# Patient Record
Sex: FEMALE | Race: WHITE | Employment: UNEMPLOYED | URBAN - METROPOLITAN AREA
[De-identification: names, ages, dates, MRNs, and addresses within clinical notes are randomized per-mention and may not be internally consistent; named-entity substitution may affect disease eponyms.]

---

## 2017-03-17 ENCOUNTER — HOSPITAL ENCOUNTER (OUTPATIENT)
Dept: OTHER | Age: 37
Discharge: HOME | End: 2017-03-17
Attending: INTERNAL MEDICINE | Admitting: INTERNAL MEDICINE

## 2017-03-17 LAB
ALBUMIN SERPL-MCNC: 4.6 GM/DL (ref 3.4–5)
ALP BLD-CCNC: 113 IU/L (ref 40–129)
ALT SERPL-CCNC: 59 U/L (ref 10–40)
AST SERPL-CCNC: 38 IU/L (ref 15–37)
BILIRUB SERPL-MCNC: 0.2 MG/DL (ref 0–1)
BILIRUBIN DIRECT: 0.2 MG/DL (ref 0–0.3)
BILIRUBIN, INDIRECT: 0 MG/DL (ref 0–0.7)
TOTAL PROTEIN: 7.9 GM/DL (ref 6.4–8.2)

## 2017-09-27 PROBLEM — R50.9 FEVER: Status: ACTIVE | Noted: 2017-09-27

## 2017-09-27 PROBLEM — R00.0 TACHYCARDIA: Status: ACTIVE | Noted: 2017-09-27

## 2017-09-27 PROBLEM — F19.10 POLYSUBSTANCE ABUSE (HCC): Chronic | Status: ACTIVE | Noted: 2017-09-27

## 2017-09-27 PROBLEM — Z72.0 TOBACCO ABUSE: Status: ACTIVE | Noted: 2017-09-27

## 2017-09-27 PROBLEM — M79.10 GENERALIZED MUSCLE ACHE: Status: ACTIVE | Noted: 2017-09-27

## 2017-09-27 PROBLEM — M54.50 LOW BACK PAIN: Status: ACTIVE | Noted: 2017-09-27

## 2018-04-04 ENCOUNTER — HOSPITAL ENCOUNTER (OUTPATIENT)
Dept: OTHER | Age: 38
Discharge: HOME | End: 2018-04-04
Attending: EMERGENCY MEDICINE | Admitting: EMERGENCY MEDICINE

## 2018-04-08 LAB
CULTURE: NORMAL
ORGANISM: NORMAL
REPORT STATUS: NORMAL
REQUEST PROBLEM: NORMAL
SPECIMEN: NORMAL

## 2018-05-08 ENCOUNTER — HOSPITAL ENCOUNTER (OUTPATIENT)
Dept: WOUND CARE | Age: 38
Discharge: HOME | End: 2018-05-08
Attending: FAMILY MEDICINE | Admitting: FAMILY MEDICINE

## 2018-05-08 VITALS
BODY MASS INDEX: 26.43 KG/M2 | HEART RATE: 73 BPM | SYSTOLIC BLOOD PRESSURE: 117 MMHG | RESPIRATION RATE: 16 BRPM | TEMPERATURE: 96.8 F | DIASTOLIC BLOOD PRESSURE: 83 MMHG | HEIGHT: 61 IN | WEIGHT: 140 LBS

## 2018-05-08 DIAGNOSIS — S31.109A: ICD-10-CM

## 2018-05-08 RX ORDER — HYDROXYZINE PAMOATE 50 MG/1
50 CAPSULE ORAL 2 TIMES DAILY
COMMUNITY

## 2018-05-08 RX ORDER — NAPROXEN 500 MG/1
500 TABLET ORAL 2 TIMES DAILY WITH MEALS
COMMUNITY
End: 2018-12-07

## 2018-05-13 LAB
CULTURE: NORMAL
CULTURE: NORMAL
REPORT STATUS: NORMAL
REQUEST PROBLEM: NORMAL
SPECIMEN: NORMAL

## 2018-05-16 ENCOUNTER — HOSPITAL ENCOUNTER (OUTPATIENT)
Dept: WOUND CARE | Age: 38
Discharge: HOME | End: 2018-05-16
Attending: SURGERY | Admitting: SURGERY

## 2018-05-16 VITALS
RESPIRATION RATE: 16 BRPM | DIASTOLIC BLOOD PRESSURE: 73 MMHG | HEART RATE: 90 BPM | TEMPERATURE: 97.2 F | SYSTOLIC BLOOD PRESSURE: 112 MMHG

## 2018-05-16 DIAGNOSIS — S31.109A: Primary | ICD-10-CM

## 2018-05-16 PROCEDURE — 99213 OFFICE O/P EST LOW 20 MIN: CPT | Performed by: SURGERY

## 2018-12-07 ENCOUNTER — APPOINTMENT (OUTPATIENT)
Dept: ULTRASOUND IMAGING | Age: 38
End: 2018-12-07
Payer: MEDICAID

## 2018-12-07 ENCOUNTER — APPOINTMENT (OUTPATIENT)
Dept: CT IMAGING | Age: 38
End: 2018-12-07
Payer: MEDICAID

## 2018-12-07 ENCOUNTER — HOSPITAL ENCOUNTER (EMERGENCY)
Age: 38
Discharge: HOME | End: 2018-12-07
Attending: EMERGENCY MEDICINE
Payer: MEDICAID

## 2018-12-07 VITALS
OXYGEN SATURATION: 97 % | SYSTOLIC BLOOD PRESSURE: 112 MMHG | WEIGHT: 140 LBS | TEMPERATURE: 98.2 F | RESPIRATION RATE: 20 BRPM | BODY MASS INDEX: 26.45 KG/M2 | DIASTOLIC BLOOD PRESSURE: 66 MMHG | HEART RATE: 93 BPM

## 2018-12-07 DIAGNOSIS — N83.201 RIGHT OVARIAN CYST: Primary | ICD-10-CM

## 2018-12-07 LAB
ALBUMIN SERPL-MCNC: 4.3 GM/DL (ref 3.4–5)
ALP BLD-CCNC: 108 IU/L (ref 40–128)
ALT SERPL-CCNC: 31 U/L (ref 10–40)
ANION GAP SERPL CALCULATED.3IONS-SCNC: 12 MMOL/L (ref 4–16)
AST SERPL-CCNC: 32 IU/L (ref 15–37)
BACTERIA: ABNORMAL /HPF
BASOPHILS ABSOLUTE: 0 K/CU MM
BASOPHILS RELATIVE PERCENT: 0.4 % (ref 0–1)
BILIRUB SERPL-MCNC: 0.3 MG/DL (ref 0–1)
BILIRUBIN URINE: NEGATIVE MG/DL
BLOOD, URINE: NEGATIVE
BUN BLDV-MCNC: 9 MG/DL (ref 6–23)
CALCIUM SERPL-MCNC: 9.6 MG/DL (ref 8.3–10.6)
CHLORIDE BLD-SCNC: 101 MMOL/L (ref 99–110)
CLARITY: CLEAR
CO2: 26 MMOL/L (ref 21–32)
COLOR: YELLOW
CREAT SERPL-MCNC: 0.7 MG/DL (ref 0.6–1.1)
DIFFERENTIAL TYPE: ABNORMAL
EOSINOPHILS ABSOLUTE: 0.1 K/CU MM
EOSINOPHILS RELATIVE PERCENT: 1.6 % (ref 0–3)
GFR AFRICAN AMERICAN: >60 ML/MIN/1.73M2
GFR NON-AFRICAN AMERICAN: >60 ML/MIN/1.73M2
GLUCOSE BLD-MCNC: 110 MG/DL (ref 70–99)
GLUCOSE, URINE: NEGATIVE MG/DL
HCG QUALITATIVE: NEGATIVE
HCT VFR BLD CALC: 41.4 % (ref 37–47)
HEMOGLOBIN: 12.9 GM/DL (ref 12.5–16)
IMMATURE NEUTROPHIL %: 0.1 % (ref 0–0.43)
KETONES, URINE: NEGATIVE MG/DL
LACTATE: 1.2 MMOL/L (ref 0.4–2)
LEUKOCYTE ESTERASE, URINE: NEGATIVE
LYMPHOCYTES ABSOLUTE: 2.7 K/CU MM
LYMPHOCYTES RELATIVE PERCENT: 33 % (ref 24–44)
MCH RBC QN AUTO: 26 PG (ref 27–31)
MCHC RBC AUTO-ENTMCNC: 31.2 % (ref 32–36)
MCV RBC AUTO: 83.3 FL (ref 78–100)
MONOCYTES ABSOLUTE: 0.5 K/CU MM
MONOCYTES RELATIVE PERCENT: 5.5 % (ref 0–4)
MUCUS: ABNORMAL HPF
NITRITE URINE, QUANTITATIVE: NEGATIVE
NUCLEATED RBC %: 0 %
PDW BLD-RTO: 14 % (ref 11.7–14.9)
PH, URINE: 6 (ref 5–8)
PLATELET # BLD: 333 K/CU MM (ref 140–440)
PMV BLD AUTO: 8.7 FL (ref 7.5–11.1)
POTASSIUM SERPL-SCNC: 4.2 MMOL/L (ref 3.5–5.1)
PROTEIN UA: NEGATIVE MG/DL
RBC # BLD: 4.97 M/CU MM (ref 4.2–5.4)
RBC URINE: 13 /HPF (ref 0–6)
SEGMENTED NEUTROPHILS ABSOLUTE COUNT: 4.9 K/CU MM
SEGMENTED NEUTROPHILS RELATIVE PERCENT: 59.4 % (ref 36–66)
SODIUM BLD-SCNC: 139 MMOL/L (ref 135–145)
SPECIFIC GRAVITY UA: 1.02 (ref 1–1.03)
SQUAMOUS EPITHELIAL: 1 /HPF
TOTAL IMMATURE NEUTOROPHIL: 0.01 K/CU MM
TOTAL NUCLEATED RBC: 0 K/CU MM
TOTAL PROTEIN: 8.1 GM/DL (ref 6.4–8.2)
TRICHOMONAS: ABNORMAL /HPF
UROBILINOGEN, URINE: 1 MG/DL (ref 0.2–1)
WBC # BLD: 8.2 K/CU MM (ref 4–10.5)
WBC UA: 1 /HPF (ref 0–5)

## 2018-12-07 PROCEDURE — 74177 CT ABD & PELVIS W/CONTRAST: CPT

## 2018-12-07 PROCEDURE — 80053 COMPREHEN METABOLIC PANEL: CPT

## 2018-12-07 PROCEDURE — 6360000004 HC RX CONTRAST MEDICATION: Performed by: EMERGENCY MEDICINE

## 2018-12-07 PROCEDURE — 76830 TRANSVAGINAL US NON-OB: CPT

## 2018-12-07 PROCEDURE — 83605 ASSAY OF LACTIC ACID: CPT

## 2018-12-07 PROCEDURE — 84703 CHORIONIC GONADOTROPIN ASSAY: CPT

## 2018-12-07 PROCEDURE — 93975 VASCULAR STUDY: CPT

## 2018-12-07 PROCEDURE — 96374 THER/PROPH/DIAG INJ IV PUSH: CPT

## 2018-12-07 PROCEDURE — 36415 COLL VENOUS BLD VENIPUNCTURE: CPT

## 2018-12-07 PROCEDURE — 96375 TX/PRO/DX INJ NEW DRUG ADDON: CPT

## 2018-12-07 PROCEDURE — 6360000002 HC RX W HCPCS: Performed by: EMERGENCY MEDICINE

## 2018-12-07 PROCEDURE — 2580000003 HC RX 258: Performed by: EMERGENCY MEDICINE

## 2018-12-07 PROCEDURE — 81001 URINALYSIS AUTO W/SCOPE: CPT

## 2018-12-07 PROCEDURE — 85025 COMPLETE CBC W/AUTO DIFF WBC: CPT

## 2018-12-07 PROCEDURE — 99284 EMERGENCY DEPT VISIT MOD MDM: CPT

## 2018-12-07 RX ORDER — KETOROLAC TROMETHAMINE 30 MG/ML
30 INJECTION, SOLUTION INTRAMUSCULAR; INTRAVENOUS ONCE
Status: COMPLETED | OUTPATIENT
Start: 2018-12-07 | End: 2018-12-07

## 2018-12-07 RX ORDER — FENTANYL CITRATE 50 UG/ML
50 INJECTION, SOLUTION INTRAMUSCULAR; INTRAVENOUS ONCE
Status: COMPLETED | OUTPATIENT
Start: 2018-12-07 | End: 2018-12-07

## 2018-12-07 RX ORDER — SODIUM CHLORIDE 0.9 % (FLUSH) 0.9 %
10 SYRINGE (ML) INJECTION 2 TIMES DAILY
Status: DISCONTINUED | OUTPATIENT
Start: 2018-12-07 | End: 2018-12-07 | Stop reason: HOSPADM

## 2018-12-07 RX ORDER — NAPROXEN 500 MG/1
500 TABLET ORAL 2 TIMES DAILY WITH MEALS
Qty: 30 TABLET | Refills: 0 | Status: SHIPPED | OUTPATIENT
Start: 2018-12-07 | End: 2020-04-20 | Stop reason: ALTCHOICE

## 2018-12-07 RX ORDER — 0.9 % SODIUM CHLORIDE 0.9 %
1000 INTRAVENOUS SOLUTION INTRAVENOUS ONCE
Status: COMPLETED | OUTPATIENT
Start: 2018-12-07 | End: 2018-12-07

## 2018-12-07 RX ADMIN — FENTANYL CITRATE 50 MCG: 50 INJECTION, SOLUTION INTRAMUSCULAR; INTRAVENOUS at 03:01

## 2018-12-07 RX ADMIN — SODIUM CHLORIDE, PRESERVATIVE FREE 10 ML: 5 INJECTION INTRAVENOUS at 03:18

## 2018-12-07 RX ADMIN — SODIUM CHLORIDE 1000 ML: 9 INJECTION, SOLUTION INTRAVENOUS at 04:49

## 2018-12-07 RX ADMIN — KETOROLAC TROMETHAMINE 30 MG: 30 INJECTION, SOLUTION INTRAMUSCULAR at 06:04

## 2018-12-07 RX ADMIN — IOPAMIDOL 80 ML: 755 INJECTION, SOLUTION INTRAVENOUS at 03:18

## 2018-12-07 ASSESSMENT — PAIN DESCRIPTION - LOCATION: LOCATION: ABDOMEN

## 2018-12-07 ASSESSMENT — PAIN SCALES - GENERAL
PAINLEVEL_OUTOF10: 9
PAINLEVEL_OUTOF10: 9
PAINLEVEL_OUTOF10: 6

## 2018-12-07 NOTE — ED PROVIDER NOTES
abdomen and pelvis was performed with the administration of intravenous contrast. Multiplanar reformatted images are provided for review. Dose modulation, iterative reconstruction, and/or weight based adjustment of the mA/kV was utilized to reduce the radiation dose to as low as reasonably achievable. COMPARISON: 08/16/2017 and 09/27/2017 HISTORY: ORDERING SYSTEM PROVIDED HISTORY: ABDOMINAL PAIN TECHNOLOGIST PROVIDED HISTORY: IV contrast only. Thank you. FINDINGS: Lower Chest: The lung bases are clear and the heart size is normal.  There is minimal dependent atelectasis. Organs: The liver, spleen, pancreas, adrenal glands and kidneys are normal. There are no calcified gallstones. There is a Phrygian cap. GI/Bowel: There is a moderate to large gas and stool load throughout the colon. No evidence of bowel obstruction. The appendix is normal.  Small bowel loops are normal in caliber. Pelvis: There is soft tissue fullness in both adnexal regions. Margins of the ovaries are not well defined due to unopacified bowel and fat stranding. There is an oval-shaped cystic lesion with thin enhancing rim measuring 2.8 cm x 1.7 cm there is trace free fluid. The uterus is retroverted in position. Urinary bladder is grossly normal. Peritoneum/Retroperitoneum: There is no adenopathy, free air or free fluid. Bones/Soft Tissues: There is a midline ventral hernia in the mid pelvis containing fat and a knuckle of bowel. There is no evidence of incarceration. The size of the hernia is unchanged from the prior study with ventral defect measuring approximately 2.1 cm. There is partial lumbarization of S1. S1 is sclerotic and with a central cleft, unchanged. No acute bone finding. Soft tissue fullness in both adnexal regions with fat stranding and trace fluid. Oval-shaped cystic structure in the right adnexa measures 2.8 cm x 1.7 cm.   While findings may merely represent follicular prominence of the ovaries with physiologic fluid and a right ovarian cyst, PID is not excluded. Clinical correlation and follow-up pelvic ultrasound may be helpful. Stable appearance of the lumbar wide S1 vertebral body. Remote osteomyelitis has been suggested in the past.  No definite acute bone finding. Moderate to large gas and stool load throughout the colon suggests constipation. Us Dup Abd Pel Retro Scrot Complete    1. 2.3 cm complex right ovarian cystic lesion. Given the patient's history, this most likely reflects an atypical endometrioma. Follow-up pelvic ultrasound is recommended in 6-12 weeks. Please see additional follow-up recommendations below. 2. Normal Doppler flow within the ovaries. 3. No findings to suggest pelvic inflammatory disease. RECOMMENDATIONS: Recommend pelvic US follow-up in 6-12 weeks; if unchanged, continue follow-up with US OR MRI with IV contrast - if follow-up studies do not confirm endometrioma or dermoid, consider surgical evaluation. Reference: Radiology 2010 Sep;256(3):943-54       ED Course and MDM:  Patient is given pain medication as well as IV fluids. On repeat assessment, she is sleeping and much more comfortable. On repeat abdominal exam, she has no areas of reproducible tenderness. Labs are reviewed. No significant leukocytosis. Lactic acid normal.  Pregnancy negative. Given patient's history and presenting discomfort, did obtain imaging. There is some question of changes around the ovaries on CT. Pelvic ultrasound obtained that shows a right ovarian cyst with normal Doppler flow to both ovaries. No findings to suggest PID. As patient's vitals are now normalized and she has no reproducible tenderness, I think she is appropriate for outpatient management. She is given prescription for anti-inflammatories to use at home and referred for follow-up with ObGyn in the next 2-3 days. She is given return precautions.   Patient verbalizes understanding and is comfortable with the plan of care at

## 2019-07-28 ENCOUNTER — HOSPITAL ENCOUNTER (EMERGENCY)
Age: 39
Discharge: HOME | End: 2019-07-28
Attending: EMERGENCY MEDICINE
Payer: MEDICAID

## 2019-07-28 VITALS
OXYGEN SATURATION: 100 % | HEIGHT: 61 IN | RESPIRATION RATE: 18 BRPM | WEIGHT: 140 LBS | SYSTOLIC BLOOD PRESSURE: 130 MMHG | HEART RATE: 85 BPM | DIASTOLIC BLOOD PRESSURE: 89 MMHG | BODY MASS INDEX: 26.43 KG/M2 | TEMPERATURE: 98.4 F

## 2019-07-28 DIAGNOSIS — T40.604A OPIATE OVERDOSE, UNDETERMINED INTENT, INITIAL ENCOUNTER (HCC): Primary | ICD-10-CM

## 2019-07-28 LAB — GLUCOSE BLD-MCNC: 73 MG/DL (ref 70–99)

## 2019-07-28 PROCEDURE — 82962 GLUCOSE BLOOD TEST: CPT

## 2019-07-28 PROCEDURE — 99284 EMERGENCY DEPT VISIT MOD MDM: CPT

## 2019-07-29 NOTE — ED PROVIDER NOTES
Emergency Department Encounter    Patient: Roxana Goncalves  MRN: [id number]  : 1980  Date of Evaluation: 2019  ED Provider:  Roslyn Castro    Triage Chief Complaint:   Drug Overdose (pt admits to injecting heroin)    [place]:  Roxana Goncalves is a 46 y.o. female that presents in police custody for injecting herself with heroin today. She was found by bystander in her car being unresponsive. She has unlabored respiration upon arrival by EMS and did not give her any Narcan as she had saturation of 99% on room air. She has a history of heroin abuse in the past.  She admitted to EMS that she has been using heroin as well. There were multiple syringes near her in a car. ROS:  Review of Systems   Unable to perform ROS: Mental status change   Constitutional: Positive for activity change.          Past Medical History:   Diagnosis Date    Anxiety     Asthma     Bipolar 1 disorder ([facility] [address]     Depression     Heroin dependence ([facility] [address]     Osteomyelitis (HCC)      Past Surgical History:   Procedure Laterality Date    ABDOMINAL EXPLORATION SURGERY  11/7/15    sacral abcess debridment and drainage    TUBAL LIGATION      URETER STENT PLACEMENT       Family History   Problem Relation Age of Onset    Heart Disease Father      Social History     Socioeconomic History    Marital status: Single     Spouse name: Not on file    Number of children: 3    Years of education: Not on file    Highest education level: Not on file   Occupational History    Not on file   Social Needs    Financial resource strain: Not on file    Food insecurity:     Worry: Not on file     Inability: Not on file    Transportation needs:     Medical: Not on file     Non-medical: Not on file   Tobacco Use    Smoking status: Current Every Day Smoker     Packs/day: 2.00     Years: 23.00     Pack years: 46.00     Types: Cigarettes    Smokeless tobacco: Never Used   Substance and Sexual Activity    Alcohol use: No     Alcohol/week: 0.0 standard

## 2019-12-20 ENCOUNTER — HOSPITAL ENCOUNTER (EMERGENCY)
Age: 39
Discharge: HOME | End: 2019-12-21
Attending: EMERGENCY MEDICINE

## 2019-12-20 VITALS
TEMPERATURE: 98.5 F | WEIGHT: 135 LBS | RESPIRATION RATE: 14 BRPM | BODY MASS INDEX: 26.5 KG/M2 | HEIGHT: 60 IN | SYSTOLIC BLOOD PRESSURE: 134 MMHG | DIASTOLIC BLOOD PRESSURE: 76 MMHG | HEART RATE: 116 BPM | OXYGEN SATURATION: 99 %

## 2019-12-20 DIAGNOSIS — N39.0 ACUTE UTI: ICD-10-CM

## 2019-12-20 DIAGNOSIS — K42.9 UMBILICAL HERNIA WITHOUT OBSTRUCTION AND WITHOUT GANGRENE: Primary | ICD-10-CM

## 2019-12-20 LAB
ALBUMIN SERPL-MCNC: 3.7 GM/DL (ref 3.4–5)
ALP BLD-CCNC: 93 IU/L (ref 40–129)
ALT SERPL-CCNC: 25 U/L (ref 10–40)
AMPHETAMINES: ABNORMAL
ANION GAP SERPL CALCULATED.3IONS-SCNC: 12 MMOL/L (ref 4–16)
AST SERPL-CCNC: 29 IU/L (ref 15–37)
BARBITURATE SCREEN URINE: NEGATIVE
BASOPHILS ABSOLUTE: 0 K/CU MM
BASOPHILS RELATIVE PERCENT: 0.2 % (ref 0–1)
BENZODIAZEPINE SCREEN, URINE: NEGATIVE
BILIRUB SERPL-MCNC: 0.3 MG/DL (ref 0–1)
BUN BLDV-MCNC: 10 MG/DL (ref 6–23)
CALCIUM SERPL-MCNC: 9.1 MG/DL (ref 8.3–10.6)
CANNABINOID SCREEN URINE: NEGATIVE
CHLORIDE BLD-SCNC: 99 MMOL/L (ref 99–110)
CO2: 25 MMOL/L (ref 21–32)
COCAINE METABOLITE: NEGATIVE
CREAT SERPL-MCNC: 0.8 MG/DL (ref 0.6–1.1)
DIFFERENTIAL TYPE: ABNORMAL
EOSINOPHILS ABSOLUTE: 0.1 K/CU MM
EOSINOPHILS RELATIVE PERCENT: 0.9 % (ref 0–3)
GFR AFRICAN AMERICAN: >60 ML/MIN/1.73M2
GFR NON-AFRICAN AMERICAN: >60 ML/MIN/1.73M2
GLUCOSE BLD-MCNC: 102 MG/DL (ref 70–99)
GONADOTROPIN, CHORIONIC (HCG) QUANT: NORMAL UIU/ML
HCT VFR BLD CALC: 39.3 % (ref 37–47)
HEMOGLOBIN: 12.3 GM/DL (ref 12.5–16)
IMMATURE NEUTROPHIL %: 0.2 % (ref 0–0.43)
LYMPHOCYTES ABSOLUTE: 2 K/CU MM
LYMPHOCYTES RELATIVE PERCENT: 22.1 % (ref 24–44)
MCH RBC QN AUTO: 25.6 PG (ref 27–31)
MCHC RBC AUTO-ENTMCNC: 31.3 % (ref 32–36)
MCV RBC AUTO: 81.7 FL (ref 78–100)
MONOCYTES ABSOLUTE: 0.5 K/CU MM
MONOCYTES RELATIVE PERCENT: 5.6 % (ref 0–4)
NUCLEATED RBC %: 0 %
OPIATES, URINE: NEGATIVE
OXYCODONE: ABNORMAL
PDW BLD-RTO: 13.7 % (ref 11.7–14.9)
PHENCYCLIDINE, URINE: NEGATIVE
PLATELET # BLD: 379 K/CU MM (ref 140–440)
PMV BLD AUTO: 8.9 FL (ref 7.5–11.1)
POTASSIUM SERPL-SCNC: 3.7 MMOL/L (ref 3.5–5.1)
RBC # BLD: 4.81 M/CU MM (ref 4.2–5.4)
SEGMENTED NEUTROPHILS ABSOLUTE COUNT: 6.6 K/CU MM
SEGMENTED NEUTROPHILS RELATIVE PERCENT: 71 % (ref 36–66)
SODIUM BLD-SCNC: 136 MMOL/L (ref 135–145)
TOTAL IMMATURE NEUTOROPHIL: 0.02 K/CU MM
TOTAL NUCLEATED RBC: 0 K/CU MM
TOTAL PROTEIN: 8.1 GM/DL (ref 6.4–8.2)
WBC # BLD: 9.2 K/CU MM (ref 4–10.5)

## 2019-12-20 PROCEDURE — 81001 URINALYSIS AUTO W/SCOPE: CPT

## 2019-12-20 PROCEDURE — 84702 CHORIONIC GONADOTROPIN TEST: CPT

## 2019-12-20 PROCEDURE — 80307 DRUG TEST PRSMV CHEM ANLYZR: CPT

## 2019-12-20 PROCEDURE — 80053 COMPREHEN METABOLIC PANEL: CPT

## 2019-12-20 PROCEDURE — 99284 EMERGENCY DEPT VISIT MOD MDM: CPT

## 2019-12-20 PROCEDURE — 85025 COMPLETE CBC W/AUTO DIFF WBC: CPT

## 2019-12-20 ASSESSMENT — ENCOUNTER SYMPTOMS
EYE DISCHARGE: 0
ABDOMINAL PAIN: 1
NAUSEA: 0
SHORTNESS OF BREATH: 0
COUGH: 0
EYE PAIN: 0
SORE THROAT: 0
BACK PAIN: 0
RHINORRHEA: 0
VOMITING: 0

## 2019-12-20 ASSESSMENT — PAIN SCALES - GENERAL: PAINLEVEL_OUTOF10: 8

## 2019-12-21 ENCOUNTER — APPOINTMENT (OUTPATIENT)
Dept: CT IMAGING | Age: 39
End: 2019-12-21

## 2019-12-21 LAB
BACTERIA: NEGATIVE /HPF
BILIRUBIN URINE: NEGATIVE MG/DL
BLOOD, URINE: ABNORMAL
CLARITY: ABNORMAL
COLOR: YELLOW
GLUCOSE, URINE: NEGATIVE MG/DL
KETONES, URINE: NEGATIVE MG/DL
LEUKOCYTE ESTERASE, URINE: ABNORMAL
MUCUS: ABNORMAL HPF
NITRITE URINE, QUANTITATIVE: POSITIVE
PH, URINE: 6 (ref 5–8)
PROTEIN UA: 100 MG/DL
RBC URINE: 6 /HPF (ref 0–6)
SPECIFIC GRAVITY UA: 1.02 (ref 1–1.03)
SQUAMOUS EPITHELIAL: 3 /HPF
TRICHOMONAS: ABNORMAL /HPF
UROBILINOGEN, URINE: NORMAL MG/DL (ref 0.2–1)
WBC UA: 620 /HPF (ref 0–5)

## 2019-12-21 PROCEDURE — 74176 CT ABD & PELVIS W/O CONTRAST: CPT

## 2019-12-21 RX ORDER — CEPHALEXIN 500 MG/1
500 CAPSULE ORAL 2 TIMES DAILY
Qty: 14 CAPSULE | Refills: 0 | Status: SHIPPED | OUTPATIENT
Start: 2019-12-21 | End: 2019-12-28

## 2019-12-21 RX ORDER — CEPHALEXIN 250 MG/1
500 CAPSULE ORAL ONCE
Status: DISCONTINUED | OUTPATIENT
Start: 2019-12-21 | End: 2019-12-21 | Stop reason: HOSPADM

## 2020-04-20 ENCOUNTER — HOSPITAL ENCOUNTER (OUTPATIENT)
Age: 40
Setting detail: SPECIMEN
Discharge: HOME | End: 2020-04-20
Payer: COMMERCIAL

## 2020-04-20 PROCEDURE — 87073 CULTURE BACTERIA ANAEROBIC: CPT

## 2020-04-20 PROCEDURE — 87077 CULTURE AEROBIC IDENTIFY: CPT

## 2020-04-20 PROCEDURE — 87186 SC STD MICRODIL/AGAR DIL: CPT

## 2020-04-20 PROCEDURE — 87147 CULTURE TYPE IMMUNOLOGIC: CPT

## 2020-04-20 PROCEDURE — 87071 CULTURE AEROBIC QUANT OTHER: CPT

## 2020-04-24 LAB
CULTURE: ABNORMAL
Lab: ABNORMAL
SPECIMEN: ABNORMAL

## 2020-05-27 ENCOUNTER — HOSPITAL ENCOUNTER (OUTPATIENT)
Dept: WOUND CARE | Age: 40
Discharge: HOME | End: 2020-05-27
Payer: COMMERCIAL

## 2020-05-27 VITALS
DIASTOLIC BLOOD PRESSURE: 81 MMHG | SYSTOLIC BLOOD PRESSURE: 134 MMHG | RESPIRATION RATE: 16 BRPM | TEMPERATURE: 97.5 F | HEART RATE: 88 BPM

## 2020-05-27 PROBLEM — L98.8 FISTULA: Status: ACTIVE | Noted: 2020-05-27

## 2020-05-27 PROCEDURE — 99203 OFFICE O/P NEW LOW 30 MIN: CPT | Performed by: SURGERY

## 2020-05-27 PROCEDURE — 17250 CHEM CAUT OF GRANLTJ TISSUE: CPT

## 2020-05-27 PROCEDURE — 99213 OFFICE O/P EST LOW 20 MIN: CPT

## 2020-05-27 RX ORDER — ACETAMINOPHEN 325 MG/1
650 TABLET ORAL 2 TIMES DAILY
COMMUNITY

## 2020-05-27 RX ORDER — LANOLIN ALCOHOL/MO/W.PET/CERES
10 CREAM (GRAM) TOPICAL NIGHTLY PRN
COMMUNITY

## 2020-05-27 RX ORDER — LIDOCAINE HYDROCHLORIDE 40 MG/ML
SOLUTION TOPICAL ONCE
Status: DISCONTINUED | OUTPATIENT
Start: 2020-05-27 | End: 2020-05-28 | Stop reason: HOSPADM

## 2020-05-27 ASSESSMENT — PAIN SCALES - GENERAL: PAINLEVEL_OUTOF10: 6

## 2020-05-27 ASSESSMENT — PAIN DESCRIPTION - PAIN TYPE: TYPE: ACUTE PAIN

## 2020-05-27 ASSESSMENT — PAIN DESCRIPTION - LOCATION: LOCATION: ABDOMEN

## 2020-05-27 ASSESSMENT — PAIN DESCRIPTION - FREQUENCY: FREQUENCY: CONTINUOUS

## 2020-05-27 ASSESSMENT — PAIN DESCRIPTION - DESCRIPTORS: DESCRIPTORS: SHARP

## 2020-05-27 NOTE — PROGRESS NOTES
[facility] Initial Visit      Yvan Alcantara  AGE: 45 y.o. GENDER: female  : 1980  EPISODE DATE:  2020   Referred by:Zoey    Subjective:     CHIEF COMPLAINT Fistula     HISTORY of PRESENT ILLNESS      Yvan Alcantara is a 45 y.o. female who presents to the [address] [place] [address] for an initial visit for evaluation and treatment of Chronic fistula  wound(s) of  The lower abdominal incision. The condition is of moderate severity. The wound has been present for 5 years. She had sacral osteo and drainage of a presacral abscess in 2015. The underlying cause is thought to be foreign body. The patients care to date has included antibiotics. The patient has significant underlying medical conditions as below.      Wound Pain Timing/Severity: none  Quality of pain: N/A  Severity of pain:  0 / 10   Modifying Factors: obesity and drug abuse  Associated Signs/Symptoms: drainage        PAST MEDICAL HISTORY        Diagnosis Date    Anxiety     Asthma     Bipolar 1 disorder (HCC)     Depression     Heroin dependence ([address]     Osteomyelitis ([facility] [address]        PAST SURGICAL HISTORY    Past Surgical History:   Procedure Laterality Date    ABDOMINAL EXPLORATION SURGERY  11/7/15    sacral abcess debridment and drainage    TUBAL LIGATION      URETER STENT PLACEMENT         FAMILY HISTORY    Family History   Problem Relation Age of Onset    Heart Disease Father        SOCIAL HISTORY    Social History     Tobacco Use    Smoking status: Former Smoker     Packs/day: 0.50     Years: 23.00     Pack years: 11.50     Types: Cigarettes    Smokeless tobacco: Never Used   Substance Use Topics    Alcohol use: No     Alcohol/week: 0.0 standard drinks    Drug use: Not Currently     Types: IV, Cocaine     Comment: heroin iv  x 5-6 per day- hx cocaine         ALLERGIES    Allergies   Allergen Reactions    Celexa [Citalopram Hydrobromide] Other (See Comments)     Suicidal     Effexor [Venlafaxine Hydrochloride] Other (See Comments)     Makes her suicidal    Geodon [Ziprasidone Hcl] Shortness Of Breath    Prozac [Fluoxetine Hcl] Other (See Comments)     Makes her suicidal    Morphine Itching    Celexa [Citalopram] Other (See Comments)     Makes angry       MEDICATIONS    Current Outpatient Medications on File Prior to Encounter   Medication Sig Dispense Refill    melatonin 3 MG TABS tablet Take 10 mg by mouth nightly as needed      acetaminophen (TYLENOL) 325 MG tablet Take 650 mg by mouth 2 times daily      busPIRone (BUSPAR) 10 MG tablet Take 10 mg by mouth 3 times daily      hydrOXYzine (VISTARIL) 50 MG capsule Take 50 mg by mouth 2 times daily       No current facility-administered medications on file prior to encounter. PROBLEM LIST    Patient Active Problem List   Diagnosis    Drug overdose    Pelvic abscess in female    Hypokalemia    Cocaine use    Heroin abuse ([facility] [address]    Polysubstance abuse ([facility] [address]    Low back pain    Generalized muscle ache    Fever    Tachycardia    Tobacco abuse    Fistula       REVIEW OF SYSTEMS    Pertinent items are noted in HPI. Objective:      /81   Pulse 88   Temp 97.5 °F (36.4 °C) (Temporal)   Resp 16     PHYSICAL EXAM  General Appearance: alert and oriented to person, place and time, well-developed and well-nourished, in no acute distress  Skin: warm and dry, no rash or erythema  Abdomen: soft, non-tender, non-distended, normal bowel sounds, no masses or organomegaly. There is a draining fistula in the lower abdominal incision. Extremities: no cyanosis and no clubbing  Dermatologic exam: Visual inspection of the periwound reveals the skin to be normal in turgor and texture  Wound exam: see wound description below in procedure note      Assessment:       Josey Pittman  appears to have a non-healing wound of the lower abdomen.  The etiology of the wound is felt to be non-healing surgical. There are multiple complicating factors including diabetes, obesity

## 2020-06-02 ENCOUNTER — HOSPITAL ENCOUNTER (OUTPATIENT)
Dept: CT IMAGING | Age: 40
Discharge: HOME | End: 2020-06-02
Payer: COMMERCIAL

## 2020-06-02 PROCEDURE — 72194 CT PELVIS W/O & W/DYE: CPT

## 2020-06-02 PROCEDURE — 6360000004 HC RX CONTRAST MEDICATION: Performed by: SURGERY

## 2020-06-02 RX ORDER — SODIUM CHLORIDE 0.9 % (FLUSH) 0.9 %
10 SYRINGE (ML) INJECTION PRN
Status: DISCONTINUED | OUTPATIENT
Start: 2020-06-02 | End: 2020-06-03 | Stop reason: HOSPADM

## 2020-06-02 RX ADMIN — IOHEXOL 50 ML: 240 INJECTION, SOLUTION INTRATHECAL; INTRAVASCULAR; INTRAVENOUS; ORAL at 08:30

## 2020-06-10 ENCOUNTER — HOSPITAL ENCOUNTER (OUTPATIENT)
Dept: WOUND CARE | Age: 40
Discharge: HOME | End: 2020-06-10
Payer: COMMERCIAL

## 2020-06-10 VITALS
SYSTOLIC BLOOD PRESSURE: 115 MMHG | DIASTOLIC BLOOD PRESSURE: 63 MMHG | HEART RATE: 90 BPM | TEMPERATURE: 98.5 F | RESPIRATION RATE: 16 BRPM

## 2020-06-10 PROCEDURE — 99213 OFFICE O/P EST LOW 20 MIN: CPT | Performed by: SURGERY

## 2020-06-10 PROCEDURE — 99213 OFFICE O/P EST LOW 20 MIN: CPT

## 2020-06-10 RX ORDER — ESCITALOPRAM OXALATE 10 MG/1
10 TABLET ORAL DAILY
COMMUNITY

## 2020-06-10 NOTE — PROGRESS NOTES
[Citalopram] Other (See Comments)     Makes angry       MEDICATIONS    Current Outpatient Medications on File Prior to Encounter   Medication Sig Dispense Refill    escitalopram (LEXAPRO) 10 MG tablet Take 10 mg by mouth daily      melatonin 3 MG TABS tablet Take 10 mg by mouth nightly as needed      acetaminophen (TYLENOL) 325 MG tablet Take 650 mg by mouth 2 times daily      busPIRone (BUSPAR) 10 MG tablet Take 10 mg by mouth 3 times daily      hydrOXYzine (VISTARIL) 50 MG capsule Take 50 mg by mouth 2 times daily       No current facility-administered medications on file prior to encounter. REVIEW OF SYSTEMS    Pertinent items are noted in HPI. Constitutional: Negative for systemic symptoms including fever, chills and malaise. Objective:      /63   Pulse 90   Temp 98.5 °F (36.9 °C) (Temporal)   Resp 16     PHYSICAL EXAM      General: The patient is in no acute distress. Mental status:  Patient is appropriate, is  oriented to place and plan of care. Dermatologic exam: Visual inspection of the periwound reveals the skin to be normal in turgor and texture. Wound exam:  see wound description below     All active wounds listed below with today's date are evaluated      Wound 05/27/20 #2 Abdomen (Active)   Wound Image   5/27/2020  1:44 PM   Dressing Status Clean;Dry; Intact 5/27/2020  4:47 PM   Dressing Changed Changed/New 5/27/2020  4:47 PM   Wound Cleansed Rinsed/Irrigated with saline 5/27/2020  1:44 PM   Wound Length (cm) 0.5 cm 6/10/2020  1:12 PM   Wound Width (cm) 1.5 cm 6/10/2020  1:12 PM   Wound Depth (cm) 1.5 cm 6/10/2020  1:12 PM   Wound Surface Area (cm^2) 0.75 cm^2 6/10/2020  1:12 PM   Change in Wound Size % (l*w) 10.71 6/10/2020  1:12 PM   Wound Volume (cm^3) 1.12 cm^3 6/10/2020  1:12 PM   Wound Healing % -167 6/10/2020  1:12 PM   Distance Tunneling (cm) 0 cm 6/10/2020  1:12 PM   Tunneling Position ___ O'Clock 0 6/10/2020  1:12 PM   Undermining Starts ___ O'Clock 0 6/10/2020

## 2020-09-02 ENCOUNTER — TELEPHONE (OUTPATIENT)
Dept: WOUND CARE | Age: 40
End: 2020-09-02

## 2020-09-23 ENCOUNTER — HOSPITAL ENCOUNTER (EMERGENCY)
Age: 40
Discharge: HOME | End: 2020-09-23
Attending: EMERGENCY MEDICINE

## 2020-09-23 ENCOUNTER — APPOINTMENT (OUTPATIENT)
Dept: GENERAL RADIOLOGY | Age: 40
End: 2020-09-23

## 2020-09-23 VITALS
DIASTOLIC BLOOD PRESSURE: 97 MMHG | HEIGHT: 60 IN | OXYGEN SATURATION: 100 % | TEMPERATURE: 98.8 F | BODY MASS INDEX: 35.34 KG/M2 | WEIGHT: 180 LBS | SYSTOLIC BLOOD PRESSURE: 134 MMHG | HEART RATE: 99 BPM

## 2020-09-23 PROCEDURE — 71045 X-RAY EXAM CHEST 1 VIEW: CPT

## 2020-09-23 PROCEDURE — 99284 EMERGENCY DEPT VISIT MOD MDM: CPT

## 2020-09-23 RX ORDER — ONDANSETRON 4 MG/1
4 TABLET, ORALLY DISINTEGRATING ORAL ONCE
Status: DISCONTINUED | OUTPATIENT
Start: 2020-09-23 | End: 2020-09-23 | Stop reason: HOSPADM

## 2020-09-23 ASSESSMENT — PAIN DESCRIPTION - ORIENTATION: ORIENTATION: MID

## 2020-09-23 ASSESSMENT — PAIN DESCRIPTION - LOCATION: LOCATION: CHEST

## 2020-09-23 ASSESSMENT — PAIN DESCRIPTION - PAIN TYPE: TYPE: ACUTE PAIN

## 2020-09-23 ASSESSMENT — PAIN DESCRIPTION - DESCRIPTORS: DESCRIPTORS: BURNING

## 2020-09-23 ASSESSMENT — PAIN SCALES - GENERAL: PAINLEVEL_OUTOF10: 8

## 2020-09-23 NOTE — ED TRIAGE NOTES
Patient arrived to ED with EMS. EMS states per caller, patient was unresponsive and CPR was in progress. Prior to arrival, patient receive 8mg narcan from  Bystander. No narcan received by EMS. Pt denies drug use today, denies taking anything. Pt has history of heroin use.

## 2020-09-23 NOTE — ED NOTES
Patient discharged to home at this time. Discharge instructions and follow up care discussed, patient voices understanding.       Lucia Manrique RN  09/23/20 [id number]

## 2020-09-23 NOTE — ED PROVIDER NOTES
Substance and Sexual Activity    Alcohol use: No     Alcohol/week: 0.0 standard drinks    Drug use: Not Currently     Types: IV, Cocaine     Comment: heroin iv  x 5-6 per day- hx cocaine      Sexual activity: Yes     Partners: Male   Lifestyle    Physical activity     Days per week: Not on file     Minutes per session: Not on file    Stress: Not on file   Relationships    Social connections     Talks on phone: Not on file     Gets together: Not on file     Attends Muslim service: Not on file     Active member of club or organization: Not on file     Attends meetings of clubs or organizations: Not on file     Relationship status: Not on file    Intimate partner violence     Fear of current or ex partner: Not on file     Emotionally abused: Not on file     Physically abused: Not on file     Forced sexual activity: Not on file   Other Topics Concern    Not on file   Social History Narrative    Not on file     Current Facility-Administered Medications   Medication Dose Route Frequency Provider Last Rate Last Dose    ondansetron (ZOFRAN-ODT) disintegrating tablet 4 mg  4 mg Oral Once Jean-Paul Helms MD         Current Outpatient Medications   Medication Sig Dispense Refill    escitalopram (LEXAPRO) 10 MG tablet Take 10 mg by mouth daily      melatonin 3 MG TABS tablet Take 10 mg by mouth nightly as needed      acetaminophen (TYLENOL) 325 MG tablet Take 650 mg by mouth 2 times daily      busPIRone (BUSPAR) 10 MG tablet Take 10 mg by mouth 3 times daily      hydrOXYzine (VISTARIL) 50 MG capsule Take 50 mg by mouth 2 times daily       Allergies   Allergen Reactions    Celexa [Citalopram Hydrobromide] Other (See Comments)     Suicidal     Effexor [Venlafaxine Hydrochloride] Other (See Comments)     Makes her suicidal    Geodon [Ziprasidone Hcl] Shortness Of Breath    Prozac [Fluoxetine Hcl] Other (See Comments)     Makes her suicidal    Morphine Itching    Celexa [Citalopram] Other (See Comments) Makes angry       Nursing Notes Reviewed    Physical Exam:  ED Triage Vitals [09/23/20 0058]   Enc Vitals Group      BP (!) 103/56      Pulse 121      Resp       Temp 98.8 °F (37.1 °C)      Temp Source Oral      SpO2       Weight 180 lb (81.6 kg)      Height 5' (1.524 m)      Head Circumference       Peak Flow       Pain Score       Pain Loc       Pain Edu? Excl. in [place]? GENERAL APPEARANCE: Awake and alert. Cooperative. No acute distress. HEAD: Normocephalic. Atraumatic. EYES: EOM's grossly intact. Sclera anicteric. ENT: Mucous membranes are moist. Tolerates saliva. No trismus. NECK: No meningismus. HEART:  Extremities pink  LUNGS: Respirations unlabored. Even chest rise bilaterally  CHEST: No deformity. Sternal tenderness to palpation  ABDOMEN: Non distended. EXTREMITIES: No acute deformities. SKIN: Dry  NEUROLOGICAL: No gross facial drooping. Moves all 4 extremities spontaneously. PSYCHIATRIC: Normal mood. I have reviewed and interpreted all of the currently available lab results from this visit (if applicable):  No results found for this visit on 09/23/20. Radiographs (if obtained):  [] The following radiograph was interpreted by myself in the absence of a radiologist:  [x] Radiologist's Report Reviewed:      EKG (if obtained): (All EKG's are interpreted by myself in the absence of a cardiologist)    MDM:  Plan of care is discussed thoroughly with the patient and family if present. If performed, all imaging and lab work also discussed with patient. All relevant prior results and chart reviewed if available. Patient presents after an accidental opiate overdose. On arrival, the patient is alert and oriented ×4. Patient denies any history of trauma and has no pain at this time. Patient denies any suicidal attempt or coingestions. CXR without evidence of traumatic injury.   Plan on period of observation here in the emergency department to assure no further respiratory depression or changes in mental status requiring further intervention with naloxone. On reevaluation, the patient continues to have good alertness and no further complaints. Patient has not had any respiratory depression or other vital sign changes requiring further intervention in the emergency department. I feel that the patient is appropriate for discharge home at this time and follow up as needed. Patient is given appropriate resources for outpatient detox follow-up. Clinical Impression:  1.  Opiate overdose, accidental or unintentional, initial encounter [facility])      (Please note that portions of this note may have been completed with a voice recognition program. Efforts were made to edit the dictations but occasionally words are mis-transcribed.)    MD Ananda Patel MD  09/23/20 3202

## 2021-08-09 ENCOUNTER — HOSPITAL ENCOUNTER (OUTPATIENT)
Age: 41
Setting detail: SPECIMEN
Discharge: HOME | End: 2021-08-09
Payer: COMMERCIAL

## 2021-08-09 LAB
SARS-COV-2: NOT DETECTED
SOURCE: NORMAL

## 2021-08-09 PROCEDURE — U0005 INFEC AGEN DETEC AMPLI PROBE: HCPCS

## 2021-08-09 PROCEDURE — U0003 INFECTIOUS AGENT DETECTION BY NUCLEIC ACID (DNA OR RNA); SEVERE ACUTE RESPIRATORY SYNDROME CORONAVIRUS 2 (SARS-COV-2) (CORONAVIRUS DISEASE [COVID-19]), AMPLIFIED PROBE TECHNIQUE, MAKING USE OF HIGH THROUGHPUT TECHNOLOGIES AS DESCRIBED BY CMS-2020-01-R: HCPCS
